# Patient Record
Sex: FEMALE | Race: WHITE | NOT HISPANIC OR LATINO | URBAN - METROPOLITAN AREA
[De-identification: names, ages, dates, MRNs, and addresses within clinical notes are randomized per-mention and may not be internally consistent; named-entity substitution may affect disease eponyms.]

---

## 2017-03-17 ENCOUNTER — EMERGENCY (EMERGENCY)
Facility: HOSPITAL | Age: 42
LOS: 1 days | Discharge: HOME | End: 2017-03-17
Attending: EMERGENCY MEDICINE | Admitting: EMERGENCY MEDICINE
Payer: COMMERCIAL

## 2017-03-17 VITALS
TEMPERATURE: 98 F | RESPIRATION RATE: 18 BRPM | SYSTOLIC BLOOD PRESSURE: 124 MMHG | OXYGEN SATURATION: 99 % | DIASTOLIC BLOOD PRESSURE: 87 MMHG | HEART RATE: 77 BPM

## 2017-03-17 VITALS
TEMPERATURE: 99 F | HEART RATE: 72 BPM | SYSTOLIC BLOOD PRESSURE: 125 MMHG | OXYGEN SATURATION: 100 % | DIASTOLIC BLOOD PRESSURE: 76 MMHG

## 2017-03-17 DIAGNOSIS — E78.5 HYPERLIPIDEMIA, UNSPECIFIED: ICD-10-CM

## 2017-03-17 DIAGNOSIS — R07.89 OTHER CHEST PAIN: ICD-10-CM

## 2017-03-17 DIAGNOSIS — R06.02 SHORTNESS OF BREATH: ICD-10-CM

## 2017-03-17 LAB
ALBUMIN SERPL ELPH-MCNC: 4.2 G/DL — SIGNIFICANT CHANGE UP (ref 3.4–5)
ALP SERPL-CCNC: 61 U/L — SIGNIFICANT CHANGE UP (ref 40–120)
ALT FLD-CCNC: 18 U/L — SIGNIFICANT CHANGE UP (ref 12–42)
ANION GAP SERPL CALC-SCNC: 9 MMOL/L — SIGNIFICANT CHANGE UP (ref 9–16)
AST SERPL-CCNC: 39 U/L — HIGH (ref 15–37)
BASOPHILS NFR BLD AUTO: 0.3 % — SIGNIFICANT CHANGE UP (ref 0–2)
BILIRUB SERPL-MCNC: 0.4 MG/DL — SIGNIFICANT CHANGE UP (ref 0.2–1.2)
BUN SERPL-MCNC: 8 MG/DL — SIGNIFICANT CHANGE UP (ref 7–23)
CALCIUM SERPL-MCNC: 8.9 MG/DL — SIGNIFICANT CHANGE UP (ref 8.5–10.5)
CHLORIDE SERPL-SCNC: 103 MMOL/L — SIGNIFICANT CHANGE UP (ref 96–108)
CK MB BLD-MCNC: 1.23 % — SIGNIFICANT CHANGE UP
CK MB CFR SERPL CALC: 4.3 NG/ML — HIGH (ref 0.5–3.6)
CO2 SERPL-SCNC: 27 MMOL/L — SIGNIFICANT CHANGE UP (ref 22–31)
CREAT SERPL-MCNC: 0.71 MG/DL — SIGNIFICANT CHANGE UP (ref 0.5–1.3)
D DIMER BLD IA.RAPID-MCNC: <150 NG/ML DDU — SIGNIFICANT CHANGE UP
EOSINOPHIL NFR BLD AUTO: 0.4 % — SIGNIFICANT CHANGE UP (ref 0–6)
GLUCOSE SERPL-MCNC: 94 MG/DL — SIGNIFICANT CHANGE UP (ref 70–99)
HCT VFR BLD CALC: 37.9 % — SIGNIFICANT CHANGE UP (ref 34.5–45)
HGB BLD-MCNC: 12.3 G/DL — SIGNIFICANT CHANGE UP (ref 11.5–15.5)
IMM GRANULOCYTES NFR BLD AUTO: 0.2 % — SIGNIFICANT CHANGE UP (ref 0–1.5)
LYMPHOCYTES # BLD AUTO: 19 % — SIGNIFICANT CHANGE UP (ref 13–44)
MCHC RBC-ENTMCNC: 26.2 PG — LOW (ref 27–34)
MCHC RBC-ENTMCNC: 32.5 G/DL — SIGNIFICANT CHANGE UP (ref 32–36)
MCV RBC AUTO: 80.8 FL — SIGNIFICANT CHANGE UP (ref 80–100)
MONOCYTES NFR BLD AUTO: 10.8 % — SIGNIFICANT CHANGE UP (ref 2–14)
NEUTROPHILS NFR BLD AUTO: 69.3 % — SIGNIFICANT CHANGE UP (ref 43–77)
PLATELET # BLD AUTO: 251 K/UL — SIGNIFICANT CHANGE UP (ref 150–400)
POTASSIUM SERPL-MCNC: 3.6 MMOL/L — SIGNIFICANT CHANGE UP (ref 3.5–5.3)
POTASSIUM SERPL-SCNC: 3.6 MMOL/L — SIGNIFICANT CHANGE UP (ref 3.5–5.3)
PROT SERPL-MCNC: 8.3 G/DL — HIGH (ref 6.4–8.2)
RBC # BLD: 4.69 M/UL — SIGNIFICANT CHANGE UP (ref 3.8–5.2)
RBC # FLD: 15.9 % — SIGNIFICANT CHANGE UP (ref 10.3–16.9)
SODIUM SERPL-SCNC: 139 MMOL/L — SIGNIFICANT CHANGE UP (ref 132–145)
TROPONIN I SERPL-MCNC: <0.017 NG/ML — LOW (ref 0.02–0.06)
WBC # BLD: 8.9 K/UL — SIGNIFICANT CHANGE UP (ref 3.8–10.5)
WBC # FLD AUTO: 8.9 K/UL — SIGNIFICANT CHANGE UP (ref 3.8–10.5)

## 2017-03-17 PROCEDURE — 93010 ELECTROCARDIOGRAM REPORT: CPT

## 2017-03-17 PROCEDURE — 71010: CPT | Mod: 26

## 2017-03-17 PROCEDURE — 99285 EMERGENCY DEPT VISIT HI MDM: CPT | Mod: 25

## 2017-03-17 RX ORDER — SODIUM CHLORIDE 9 MG/ML
3 INJECTION INTRAMUSCULAR; INTRAVENOUS; SUBCUTANEOUS ONCE
Qty: 0 | Refills: 0 | Status: COMPLETED | OUTPATIENT
Start: 2017-03-17 | End: 2017-03-17

## 2017-03-17 RX ORDER — ASPIRIN/CALCIUM CARB/MAGNESIUM 324 MG
325 TABLET ORAL ONCE
Qty: 0 | Refills: 0 | Status: COMPLETED | OUTPATIENT
Start: 2017-03-17 | End: 2017-03-17

## 2017-03-17 RX ADMIN — SODIUM CHLORIDE 3 MILLILITER(S): 9 INJECTION INTRAMUSCULAR; INTRAVENOUS; SUBCUTANEOUS at 15:00

## 2017-03-17 RX ADMIN — Medication 325 MILLIGRAM(S): at 15:02

## 2017-03-17 NOTE — ED PROVIDER NOTE - NS ED MD SCRIBE ATTENDING SCRIBE SECTIONS
PROGRESS NOTE/HISTORY OF PRESENT ILLNESS/PHYSICAL EXAM/PAST MEDICAL/SURGICAL/SOCIAL HISTORY/RESULTS/REVIEW OF SYSTEMS/DISPOSITION/HIV/VITAL SIGNS( Pullset)

## 2017-03-17 NOTE — ED PROVIDER NOTE - MEDICAL DECISION MAKING DETAILS
Pt with atypical CP that is likely MSK in origin (location and exacerbation with movement/changes in position).  Will check EKG, CXR, and labs.  Offered NSAIDs but pt declines at this time.  Discussed dx and need to f/u with her rheumatologist - has appt for next week.     I have discussed the discharge plan with the patient. The patient agrees with the plan, as discussed.  The patient understands Emergency Department diagnosis is a preliminary diagnosis often based on limited information and that the patient must adhere to the follow-up plan as discussed.  The patient understands that if the symptoms worsen or if recommended medications do not have the desired/planned effect that the patient may return to the Emergency Department at any time for further evaluation and treatment.

## 2017-03-17 NOTE — ED PROVIDER NOTE - DIAGNOSTIC INTERPRETATION
Chest x-ray interpreted by ER Physician Dr. Mcwilliams  Findings: heart size normal, no infiltrates, lungs fully expanded, soft tissues appear normal.

## 2017-03-17 NOTE — ED PROVIDER NOTE - PROGRESS NOTE DETAILS
Discussed all results with pt and reassured.  Has f/u with her rheumatologist next week.  Declines any pain medications and reports she will use Advil prn.

## 2017-03-17 NOTE — ED PROVIDER NOTE - OBJECTIVE STATEMENT
41y.o female with a PMHx of RA (recently diagnosed) presents to the ED c/o CP. Pt states she feels soreness in her chest. 41y.o female with a PMHx of HLD, and RA (recently diagnosed) presents to the ED c/o CP. Pt states she feels soreness in her chest. Last night before bed she started to feel chest tightness and woke up this morning with the same chest tightness but with radiation to her shoulder blades and notes it is getting hard for her to breathe. Denies smoking, and calf pain and swelling. Grandmother has a history of a blood clot in the brain but survived. Pt also notes she exercises with a . 41y.o female with a PMHx of HLD, and RA (recently diagnosed) presents to the ED c/o CP. Pt states she feels soreness in her chest. Last night before bed she started to feel chest tightness and woke up this morning with the same chest tightness but with radiation to her shoulder blades and notes it is getting hard for her to breathe. Denies smoking, and calf pain and swelling. Grandmother has a history of a blood clot in the brain but survived. Pt also notes she exercises with a  today without issue.

## 2018-10-19 NOTE — ED ADULT NURSE NOTE - IN THE PAST 12 MONTHS HAVE YOU USED DRUGS OTHER THAN THOSE REQUIRED FOR MEDICAL REASON?
neuropathy ([facility] [address]     Extrinsic asthma     Hyperlipidemia     Hypertension     Idiopathic peripheral neuropathy     Lyme disease     Mediastinal lymphadenopathy     Microalbuminuria     Mixed hyperlipidemia     Paroxysmal A-fib (HCC)     S/P ablation of atrial fibrillation     4/16 A fib ablation , Dr Justin Neal, [address] Sarcoidosis, lung [facility])     restrictive lung impairment    Tick bite     Type 2 diabetes mellitus with peripheral neuropathy ([facility] [address]     Type 2 diabetes, controlled, with neuropathy ([facility] [address]       Past Surgical History:   Procedure Laterality Date    ANKLE FRACTURE SURGERY  april 14, 2015   [address] SURGERY  2015    Dr. Beth Toney      Catheter Ablation A-fib    CHOLECYSTECTOMY      COLONOSCOPY  12/02/2014    [facility]    RI CYSTOSCOPY,MANIPULATN Left 6/27/2018    LASER LITHOTRIPSY STONE MANIPULATION WITH DOUBLE J STENT PLACEMENT performed by Sarika Guadalupe MD at [facility] Left 6/27/2018    CYSTOSCOPY URETEROSCOPY RETROGRADE PYELOGRAMS performed by Sarika Guadalupe MD at [address] [place] [address] 10/19/2018 10/18/2018 10/18/2018 10/18/2018 10/9/2018 2/08/8479   SYSTOLIC [phone]   DIASTOLIC 82 80 90 90 88 75   Site - - - - - -   Position - - - - - -   Pulse 91 - - 94 85 90   Temp - - - - - 97.8   Resp 16 - - - - -   Weight 185 lb - - 190 lb 6.4 oz 185 lb 11.2 oz 181 lb   Height 5' 9\" - - 5' 9\" 5' 9\" 5' 9\"   BMI (wt*703/ht~2) 27.32 kg/m2 - - 28.11 kg/m2 27.42 kg/m2 26.73 kg/m2   Some recent data might be hidden       Family History   Problem Relation Age of Onset    Coronary Art Dis Father         AICD pacer age 68    Cancer Sister         childhood chest tumor       Social History   Substance Use Topics    Smoking status: Former Smoker     Packs/day: 1.00     Years: 3.00     Quit date: 6/22/1983    Smokeless tobacco: Never Used    Alcohol use No      Current Outpatient Prescriptions   Medication Sig Dispense Refill    SITagliptin (JANUVIA) 50 MG tablet Take 2 tablets by mouth daily 90 tablet 2    albuterol sulfate HFA (VENTOLIN HFA) 108 (90 Base) MCG/ACT inhaler Inhale 2 puffs into the lungs every 6 hours as needed for Wheezing 1 Inhaler 5    cloNIDine (CATAPRES) 0.1 MG tablet Take 1 tablet by mouth 2 times daily as needed for High Blood Pressure (b/p over 160/90) 60 tablet 3    pregabalin (LYRICA) 50 MG capsule Take 1 capsule by mouth nightly for 30 days. . 30 capsule 0    metoprolol tartrate (LOPRESSOR) 25 MG tablet Take 25 mg by mouth 2 times daily      IRON PO Take by mouth      sildenafil (REVATIO) 20 MG tablet Take 1-2 as needed 60 tablet 2    rivaroxaban (XARELTO) 20 MG TABS tablet TAKE ONE TABLET DAILY WITH  EVENING  MEAL 90 tablet 3    losartan (COZAAR) 100 MG tablet Take 1 tablet by mouth daily 90 tablet 3    ADVAIR DISKUS 500-50 MCG/DOSE diskus inhaler INHALE ONE DOSE BY MOUTH TWICE DAILY 1 Inhaler 5    aspirin 81 MG tablet Take 81 mg by mouth daily.  EPINEPHrine (EPIPEN 2-PRACHI) 0.3 MG/0.3ML SOAJ injection Inject 0.3 mLs into the muscle once for 1 dose Use as directed for allergic reaction 1 each 0     No current facility-administered medications for this visit. Allergies   Allergen Reactions    Acetaminophen     Bee Venom     Gabapentin      Causes blisters to head.      Penicillins     Toprol Xl [Metoprolol]      Leg pain        Health Maintenance   Topic Date Due    Hepatitis C screen  1951    Diabetic foot exam  09/15/1959    Flu vaccine (1) 09/01/2018    Shingles Vaccine (1 of 2 - 2 Dose Series) 06/22/2019 (Originally 9/15/1999)    DTaP/Tdap/Td vaccine (1 - Tdap) 10/07/2019 (Originally 9/[birth year]/1968)    A1C test (Diabetic or Prediabetic)  04/20/2019    Diabetic microalbuminuria test  04/20/2019    Lipid screen  04/20/2019    Diabetic retinal exam  05/09/2019    Potassium monitoring  10/18/2019    Creatinine monitoring  10/18/2019    Colon cancer screen No

## 2022-09-22 NOTE — ED PROVIDER NOTE - DURATION
Message/Telephone call regarding - New or worsening symptoms      Symptoms reported - Pulmonary / Respiratory / Ears, Nose, Throat- Concern for COVID - COVID Symptom start date - 09/17/22 and COVID Test - No, cough - clear  and thin, sore throat, nasal congestion / runny nose, and headache - ALERT - INDICATE RED VISIT IN APPT NOTES  Pain Scale - 5    Symptom Onset Date - 09/17/22  Onset - with a sudden onset    Aggravating factors - mornings seem worse coughing throughout the night   Relieving actions and treatment(s) attempted -  tly cough drops     Last Appointment at [facility] - Visit date not found  Next Appointment - [email or web address]      Is there any other information to share with PCP -  yes - transferred through nurse triage, scheduled with a virtual provider tomorrow 09/23/22 at 7am. Please advise if PCP wants a different tx plan    REFRESH after scheduling appointment.     Future Appointments   Date Time Provider Chelsie Mccormack   9/23/2022  7:00 AM MALCOLM Dailey - CNP [facility] VIRTUAL [facility]
Received call from [facility] at [facility] with Red Flag Complaint. Current Symptoms: sore throat, productive cough with clear sputum, sinus congestion    Speaking in complete sentences, speech is clear      Sates saw ENT on Tuesday    Denies - difficulty swallowing / white spots in throat / ear pain / rash / abdominal pain    Onset: 5 days ago;       Pain Severity: 4/10; Temperature: denies     What has been tried: tylenol sinus        Recommended disposition: See in Office Today    Care advice provided, patient verbalizes understanding; denies any other questions or concerns; instructed to call back for any new or worsening symptoms. Patient/Caller agrees with recommended disposition; writer provided warm transfer to [employer] [facility] for appointment scheduling     Attention Provider: Thank you for allowing me to participate in the care of your patient. The patient was connected to triage in response to information provided to the ECC/PSC. Please do not respond through this encounter as the response is not directed to a shared pool.         Reason for Disposition   Patient wants to be seen    Protocols used: Sore Throat-ADULT-[place]
yesterday

## 2023-05-10 PROBLEM — E78.5 HYPERLIPIDEMIA, UNSPECIFIED: Chronic | Status: ACTIVE | Noted: 2017-03-17

## 2023-05-10 PROBLEM — M06.9 RHEUMATOID ARTHRITIS, UNSPECIFIED: Chronic | Status: ACTIVE | Noted: 2017-03-17

## 2023-05-23 ENCOUNTER — NON-APPOINTMENT (OUTPATIENT)
Age: 48
End: 2023-05-23

## 2023-06-14 PROBLEM — Z00.00 ENCOUNTER FOR PREVENTIVE HEALTH EXAMINATION: Status: ACTIVE | Noted: 2023-06-14

## 2023-07-28 ENCOUNTER — LABORATORY RESULT (OUTPATIENT)
Age: 48
End: 2023-07-28

## 2023-07-28 ENCOUNTER — APPOINTMENT (OUTPATIENT)
Dept: RHEUMATOLOGY | Facility: CLINIC | Age: 48
End: 2023-07-28
Payer: COMMERCIAL

## 2023-07-28 VITALS
SYSTOLIC BLOOD PRESSURE: 140 MMHG | DIASTOLIC BLOOD PRESSURE: 93 MMHG | HEART RATE: 99 BPM | OXYGEN SATURATION: 98 % | HEIGHT: 66.93 IN | WEIGHT: 225 LBS | BODY MASS INDEX: 35.31 KG/M2 | TEMPERATURE: 97.5 F

## 2023-07-28 DIAGNOSIS — M06.9 RHEUMATOID ARTHRITIS, UNSPECIFIED: ICD-10-CM

## 2023-07-28 DIAGNOSIS — Z80.52 FAMILY HISTORY OF MALIGNANT NEOPLASM OF BLADDER: ICD-10-CM

## 2023-07-28 DIAGNOSIS — M79.672 PAIN IN RIGHT FOOT: ICD-10-CM

## 2023-07-28 DIAGNOSIS — M79.671 PAIN IN RIGHT FOOT: ICD-10-CM

## 2023-07-28 DIAGNOSIS — M79.641 PAIN IN RIGHT HAND: ICD-10-CM

## 2023-07-28 DIAGNOSIS — Z82.49 FAMILY HISTORY OF ISCHEMIC HEART DISEASE AND OTHER DISEASES OF THE CIRCULATORY SYSTEM: ICD-10-CM

## 2023-07-28 PROCEDURE — 36415 COLL VENOUS BLD VENIPUNCTURE: CPT

## 2023-07-28 PROCEDURE — 99203 OFFICE O/P NEW LOW 30 MIN: CPT | Mod: 25

## 2023-08-09 ENCOUNTER — NON-APPOINTMENT (OUTPATIENT)
Age: 48
End: 2023-08-09

## 2023-08-09 LAB
ANA PAT FLD IF-IMP: ABNORMAL
ANACR T: ABNORMAL
CCP AB SER IA-ACNC: 199 UNITS
CRP SERPL-MCNC: <3 MG/L
ERYTHROCYTE [SEDIMENTATION RATE] IN BLOOD BY WESTERGREN METHOD: 8 MM/HR
RF+CCP IGG SER-IMP: ABNORMAL
RHEUMATOID FACT SER QL: 38 IU/ML

## 2023-09-11 PROBLEM — Z82.49 FAMILY HISTORY OF CARDIAC DISORDER: Status: ACTIVE | Noted: 2023-07-28

## 2023-09-11 PROBLEM — Z80.52 FAMILY HISTORY OF MALIGNANT NEOPLASM OF URINARY BLADDER: Status: ACTIVE | Noted: 2023-07-28

## 2023-09-11 PROBLEM — Z82.49 FAMILY HISTORY OF MYOCARDIAL INFARCTION: Status: ACTIVE | Noted: 2023-07-28

## 2023-09-22 ENCOUNTER — RESULT REVIEW (OUTPATIENT)
Age: 48
End: 2023-09-22

## 2023-12-29 NOTE — HISTORY OF PRESENT ILLNESS
[FreeTextEntry1] : July 28, 2023 Diagnosed with RA in 2018 had spine surgery in 2017, successful, fusion of L4-L5 Had terrible pain with sciatica prior to surgery Was fully herself after surgery, then 6-8 months later Had weird episode, left shoulder pain and could not move Went to orthopedist, had steroid injection with relief then had right shoulder, was not as bad, did not need steroid injection  Then developed left knee Went to [facility], diagnosed with RA Started MTX, took for about 6 weeks, felt ulcers, nausea, could not tolerate Stopped taking it, did not feels symptoms were that bad at the time Stays on anti-inflammatory diet tried to exercise  Ava Cruz Recommended to start on enbrel, PNA vaccine, did not have shingles vaccine to date then Covid, did not take  The last year, symptoms worsen and cannot write, cannot use the right hand Exercise decreased Pain in the hands and feet, wrists occasionally knee or shoulder  Takes advil as needed, sometimes 800-1000 mg at time Has not had blood tests recently right hand pain for about 4-6 months  Early June 2023- 2011, 2012 had children, carpal tunnel during pregnancy, had injection, Dr. Childs had xrays of the hands, no erosions

## 2023-12-29 NOTE — ASSESSMENT
[FreeTextEntry1] : 48 year old woman with diagnosis of RA, initially in 2018, most recently evaluated at [facility].  Patient was started on MTX 15 mg qweek, took for about six weeks but was unable to tolerate secondary to side effects of oral ulcers and nausea.  Patient was recommended to start enbrel, was concerned about side effects of medication and here to further discuss.  Patient with active synovitis on exam, will update labs today in office, including CBC, CMP, ESR, CRP, CALEB with reflex to serologies, RF, and CCP.  Patient will forward previous records to office as well. Further management pending results.  EMR update in progress.

## 2024-08-21 ENCOUNTER — APPOINTMENT (OUTPATIENT)
Dept: RHEUMATOLOGY | Facility: CLINIC | Age: 49
End: 2024-08-21

## 2024-08-26 NOTE — HISTORY OF PRESENT ILLNESS
[FreeTextEntry1] : August 21, 2024 Patient returns for a follow up visit   July 28, 2023 Diagnosed with RA in 2018 had spine surgery in 2017, successful, fusion of L4-L5 Had terrible pain with sciatica prior to surgery Was fully herself after surgery, then 6-8 months later Had weird episode, left shoulder pain and could not move Went to orthopedist, had steroid injection with relief then had right shoulder, was not as bad, did not need steroid injection  Then developed left knee Went to [facility], diagnosed with RA Started MTX, took for about 6 weeks, felt ulcers, nausea, could not tolerate Stopped taking it, did not feels symptoms were that bad at the time Stays on anti-inflammatory diet tried to exercise  Ava Cruz Recommended to start on enbrel, PNA vaccine, did not have shingles vaccine to date then Covid, did not take  The last year, symptoms worsen and cannot write, cannot use the right hand Exercise decreased Pain in the hands and feet, wrists occasionally knee or shoulder  Takes advil as needed, sometimes 800-1000 mg at time Has not had blood tests recently right hand pain for about 4-6 months  Early June 2023- 2011, 2012 had children, carpal tunnel during pregnancy, had injection, Dr. Childs had xrays of the hands, no erosions

## 2024-08-26 NOTE — ASSESSMENT
[FreeTextEntry1] : 48 year old woman with diagnosis of RA, initially in 2018, most recently evaluated at [facility].  Patient was started on MTX 15 mg qweek, took for about six weeks but was unable to tolerate secondary to side effects of oral ulcers and nausea.  Patient was recommended to start enbrel, was concerned about side effects of medication and here to further discuss.  Patient with active synovitis on exam, will update labs today in office, including CBC, CMP, ESR, CRP, CALEB with reflex to serologies, RF, and CCP.  Patient will forward previous records to office as well. Further management pending results.  EMR update in progress.